# Patient Record
Sex: FEMALE | URBAN - METROPOLITAN AREA
[De-identification: names, ages, dates, MRNs, and addresses within clinical notes are randomized per-mention and may not be internally consistent; named-entity substitution may affect disease eponyms.]

---

## 2023-09-14 ENCOUNTER — OFFICE VISIT (OUTPATIENT)
Age: 48
End: 2023-09-14

## 2023-09-14 VITALS
DIASTOLIC BLOOD PRESSURE: 92 MMHG | TEMPERATURE: 98.1 F | SYSTOLIC BLOOD PRESSURE: 138 MMHG | HEIGHT: 68 IN | BODY MASS INDEX: 30.02 KG/M2 | HEART RATE: 80 BPM | OXYGEN SATURATION: 96 % | WEIGHT: 198.1 LBS

## 2023-09-14 DIAGNOSIS — J01.90 ACUTE SINUSITIS, RECURRENCE NOT SPECIFIED, UNSPECIFIED LOCATION: ICD-10-CM

## 2023-09-14 DIAGNOSIS — J40 BRONCHITIS: Primary | ICD-10-CM

## 2023-09-14 LAB
Lab: NORMAL
QC PASS/FAIL: NORMAL
SARS-COV-2 RDRP RESP QL NAA+PROBE: NEGATIVE

## 2023-09-14 RX ORDER — BENZONATATE 100 MG/1
100 CAPSULE ORAL EVERY 8 HOURS PRN
Qty: 30 CAPSULE | Refills: 0 | Status: SHIPPED | OUTPATIENT
Start: 2023-09-14 | End: 2023-09-24

## 2023-09-14 RX ORDER — AMOXICILLIN 875 MG/1
875 TABLET, COATED ORAL 2 TIMES DAILY
Qty: 20 TABLET | Refills: 0 | Status: SHIPPED | OUTPATIENT
Start: 2023-09-14 | End: 2023-09-24

## 2023-09-14 RX ORDER — LEVOTHYROXINE SODIUM 0.1 MG/1
100 TABLET ORAL DAILY
COMMUNITY

## 2023-09-14 NOTE — PATIENT INSTRUCTIONS
Keep hydrated, tylenol/advil as needed for fever/pain  take medications as prescribed. Go to  ER if trouble breathing/symptoms worse/feeling worse or has any new symptoms or concerns. .  follow up in 1 week if not better

## 2024-01-04 ENCOUNTER — OFFICE VISIT (OUTPATIENT)
Age: 49
End: 2024-01-04

## 2024-01-04 VITALS
TEMPERATURE: 98 F | HEART RATE: 82 BPM | HEIGHT: 68 IN | BODY MASS INDEX: 30.14 KG/M2 | WEIGHT: 198.9 LBS | DIASTOLIC BLOOD PRESSURE: 85 MMHG | OXYGEN SATURATION: 98 % | SYSTOLIC BLOOD PRESSURE: 134 MMHG

## 2024-01-04 DIAGNOSIS — H92.09 EAR ACHE: ICD-10-CM

## 2024-01-04 DIAGNOSIS — J01.00 ACUTE MAXILLARY SINUSITIS, RECURRENCE NOT SPECIFIED: Primary | ICD-10-CM

## 2024-01-04 DIAGNOSIS — J02.9 ACUTE PHARYNGITIS, UNSPECIFIED ETIOLOGY: ICD-10-CM

## 2024-01-04 RX ORDER — METHYLPREDNISOLONE 4 MG/1
TABLET ORAL
Qty: 21 TABLET | Refills: 0 | Status: SHIPPED | OUTPATIENT
Start: 2024-01-04 | End: 2024-01-10

## 2024-01-04 RX ORDER — AMOXICILLIN 500 MG/1
500 CAPSULE ORAL 2 TIMES DAILY
Qty: 14 CAPSULE | Refills: 0 | Status: SHIPPED | OUTPATIENT
Start: 2024-01-04 | End: 2024-01-11

## 2024-01-04 NOTE — PATIENT INSTRUCTIONS
I suspect you are having sinus infection.  All symptoms on right side.  Cannot test for strep throat today but no signs consistent.  Due to reported pressure on right side of face I will provide with steroid treatment.  Also course of Amoxicillin 5-7 days.  Return if any changes or new concerns.  Continue to rest and hydrate.

## 2024-01-04 NOTE — PROGRESS NOTES
Anjelica Lemus (:  1975) is a 48 y.o. female,Established patient, here for evaluation of the following chief complaint(s):  Pharyngitis (Sore throat, runny nose, right ear pain  and congestion for three days)      ASSESSMENT/PLAN:  Visit Diagnoses and Associated Orders       Acute maxillary sinusitis, recurrence not specified    -  Primary    amoxicillin (AMOXIL) 500 MG capsule [451]      methylPREDNISolone (MEDROL, GAYLA,) 4 MG tablet [4991]           Ear ache             Acute pharyngitis, unspecified etiology                    I suspect you are having sinus infection.  All symptoms on right side.  Cannot test for strep throat today but no signs consistent.  Due to reported pressure on right side of face I will provide with steroid treatment.  Also course of Amoxicillin 5-7 days.  Return if any changes or new concerns.  Continue to rest and hydrate.      SUBJECTIVE/OBJECTIVE:    3 days of sore throat and sinus congestion.  Right side sinus congestion.  Waking up at night with \"blocked\" nasal passage.  Right ear feels full.  Denies cough.  Feels like tonsils are touching.  Occasional runny nose with PND.  Has children that were recently sick at home.  Symptoms are different and reports previous bronchitis and sinusitis.        History provided by:  Patient   used: No        ROS: See HPI       Vitals:    24 0901   BP: 134/85   Site: Right Upper Arm   Position: Sitting   Cuff Size: Medium Adult   Pulse: 82   Temp: 98 °F (36.7 °C)   TempSrc: Oral   SpO2: 98%   Weight: 90.2 kg (198 lb 14.4 oz)   Height: 1.727 m (5' 8\")       No results found for this visit on 24.     Physical Exam  Vitals and nursing note reviewed.   Constitutional:       Appearance: Normal appearance.   HENT:      Right Ear: Hearing normal. No tenderness. Tympanic membrane is erythematous. Tympanic membrane is not injected or bulging.      Left Ear: Hearing, tympanic membrane and ear canal normal.      Nose:

## 2025-08-17 ENCOUNTER — OFFICE VISIT (OUTPATIENT)
Age: 50
End: 2025-08-17

## 2025-08-17 VITALS
SYSTOLIC BLOOD PRESSURE: 127 MMHG | OXYGEN SATURATION: 97 % | TEMPERATURE: 97.7 F | WEIGHT: 190 LBS | HEIGHT: 68 IN | HEART RATE: 87 BPM | BODY MASS INDEX: 28.79 KG/M2 | DIASTOLIC BLOOD PRESSURE: 79 MMHG

## 2025-08-17 DIAGNOSIS — J01.90 ACUTE NON-RECURRENT SINUSITIS, UNSPECIFIED LOCATION: Primary | ICD-10-CM

## 2025-08-17 RX ORDER — DEXTROMETHORPHAN HYDROBROMIDE AND PROMETHAZINE HYDROCHLORIDE 15; 6.25 MG/5ML; MG/5ML
5 SYRUP ORAL 4 TIMES DAILY PRN
Qty: 118 ML | Refills: 0 | Status: SHIPPED | OUTPATIENT
Start: 2025-08-17 | End: 2025-08-24

## 2025-08-17 RX ORDER — METHYLPREDNISOLONE 4 MG/1
TABLET ORAL
Qty: 1 KIT | Refills: 0 | Status: SHIPPED | OUTPATIENT
Start: 2025-08-17 | End: 2025-08-23

## 2025-08-20 ENCOUNTER — OFFICE VISIT (OUTPATIENT)
Age: 50
End: 2025-08-20

## 2025-08-20 VITALS
TEMPERATURE: 98.6 F | DIASTOLIC BLOOD PRESSURE: 72 MMHG | SYSTOLIC BLOOD PRESSURE: 126 MMHG | BODY MASS INDEX: 28.89 KG/M2 | WEIGHT: 190 LBS | HEART RATE: 91 BPM | OXYGEN SATURATION: 95 %

## 2025-08-20 DIAGNOSIS — R03.0 ELEVATED BLOOD PRESSURE READING: ICD-10-CM

## 2025-08-20 DIAGNOSIS — U07.1 COVID-19: Primary | ICD-10-CM

## 2025-08-20 LAB
Lab: ABNORMAL
PERFORMING INSTRUMENT: ABNORMAL
QC PASS/FAIL: ABNORMAL
SARS-COV-2, POC: DETECTED

## 2025-08-20 ASSESSMENT — ENCOUNTER SYMPTOMS
SINUS PAIN: 0
TROUBLE SWALLOWING: 0
COUGH: 0
ABDOMINAL PAIN: 0
SINUS PRESSURE: 0
NAUSEA: 0
VOMITING: 0
SORE THROAT: 1